# Patient Record
Sex: FEMALE | Race: WHITE | NOT HISPANIC OR LATINO | Employment: UNEMPLOYED | URBAN - METROPOLITAN AREA
[De-identification: names, ages, dates, MRNs, and addresses within clinical notes are randomized per-mention and may not be internally consistent; named-entity substitution may affect disease eponyms.]

---

## 2017-08-15 ENCOUNTER — HOSPITAL ENCOUNTER (EMERGENCY)
Facility: HOSPITAL | Age: 57
Discharge: HOME | End: 2017-08-15
Attending: FAMILY MEDICINE | Admitting: FAMILY MEDICINE
Payer: MEDICAID

## 2017-08-15 VITALS
RESPIRATION RATE: 16 BRPM | HEIGHT: 65 IN | WEIGHT: 210 LBS | HEART RATE: 85 BPM | BODY MASS INDEX: 34.99 KG/M2 | DIASTOLIC BLOOD PRESSURE: 97 MMHG | TEMPERATURE: 98 F | SYSTOLIC BLOOD PRESSURE: 173 MMHG

## 2017-08-15 DIAGNOSIS — M25.562 PAIN IN LEFT KNEE: ICD-10-CM

## 2017-08-15 DIAGNOSIS — M25.561 PAIN IN RIGHT KNEE: ICD-10-CM
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
## 2017-08-15 DIAGNOSIS — M54.2 PAIN, NECK: ICD-10-CM

## 2017-08-15 DIAGNOSIS — M54.9 PAIN IN BACK: ICD-10-CM

## 2017-08-15 LAB — POCT GLUCOSE: 99 MG/DL (ref 70–110)

## 2017-08-15 PROCEDURE — 82962 GLUCOSE BLOOD TEST: CPT

## 2017-08-15 PROCEDURE — 99284 EMERGENCY DEPT VISIT MOD MDM: CPT

## 2017-08-15 PROCEDURE — 25000003 PHARM REV CODE 250: Performed by: PHYSICIAN ASSISTANT

## 2017-08-15 PROCEDURE — 99284 EMERGENCY DEPT VISIT MOD MDM: CPT | Mod: ,,, | Performed by: PHYSICIAN ASSISTANT

## 2017-08-15 RX ORDER — ACETAMINOPHEN 325 MG/1
650 TABLET ORAL
Status: COMPLETED | OUTPATIENT
Start: 2017-08-15 | End: 2017-08-15

## 2017-08-15 RX ADMIN — ACETAMINOPHEN 650 MG: 325 TABLET ORAL at 03:08

## 2017-08-15 NOTE — DISCHARGE INSTRUCTIONS
Take Tylenol on a scheduled basis regularly for the next few days for pain relief.  Apply ice to the areas.  Elevate areas higher than the level of the heart to reduce swelling and pain.  Follow-up with primary care physician at [facility] if your symptoms do not improve or worsen.    No future appointments.    Our goal in the emergency department is to always give you outstanding care and exceptional service. You may receive a survey by mail or e-mail in the next week regarding your experience in our ED. We would greatly appreciate your completing and returning the survey. Your feedback provides us with a way to recognize our staff who give very good care and it helps us learn how to improve when your experience was below our aspiration of excellence.

## 2017-08-15 NOTE — ED PROVIDER NOTES
Encounter Date: 8/15/2017       History     Chief Complaint   Patient presents with    Back Pain     Patient reports bilateral knee, shoulder, neck, and back pain with a headache after tripping on a cord.     Patient is a 57-year-old female with past medical history of HTN, DM, COPD, BP 1 who presents the ED via EMS status post fall.  Patient states that she was at an eyeglass store when she turned and tripped over her sister's oxygen tank.  Patient states that she fell and landed on her upper extremities initially then her whole body.  She denies any head trauma or LOC.  Patient complains of generalized pain to her back, neck, and lower extremities.  She complains of 9/10 pain mainly to her knees.  She denies any fever, chills, lower extremity paresthesias, bowel/bladder incontinence, saddle anesthesias.  She has no other complaint at this time.          Review of patient's allergies indicates:   Allergen Reactions    Neomycin Shortness Of Breath     Past Medical History:   Diagnosis Date    Anemia     Anorexia     Anxiety     Bipolar 1 disorder     Bulimia     COPD (chronic obstructive pulmonary disease)     Diabetes mellitus     GERD (gastroesophageal reflux disease)     Gout     H/O bladder problems     Hypertension     Panic attacks      Past Surgical History:   Procedure Laterality Date    DILATION AND CURETTAGE OF UTERUS      fatty tumor      l wrist      NASAL ENDOSCOPY W/ DONIS SINUPLASTY  08/01/2017    TONSILLECTOMY      TUBAL LIGATION       Family History   Problem Relation Age of Onset    Diabetes Mother     Hypertension Mother     Diabetes Father     Hypertension Father      Social History   Substance Use Topics    Smoking status: Current Every Day Smoker     Packs/day: 0.50     Years: 30.00     Types: Cigarettes    Smokeless tobacco: Never Used    Alcohol use No     Review of Systems   Constitutional: Negative for chills and fever.   HENT: Negative for nosebleeds and sore  throat.    Eyes: Negative for visual disturbance.   Respiratory: Negative for shortness of breath.    Cardiovascular: Negative for chest pain.   Gastrointestinal: Negative for abdominal pain and nausea.   Endocrine: Negative for polyuria.   Genitourinary: Negative for dysuria.   Musculoskeletal: Positive for arthralgias. Negative for back pain and joint swelling.   Skin: Negative for rash.   Neurological: Negative for weakness and numbness.   Hematological: Does not bruise/bleed easily.       Physical Exam     Initial Vitals [08/15/17 1336]   BP Pulse Resp Temp SpO2   (!) 152/88 88 15 98 °F (36.7 °C) --      MAP       109.33         Physical Exam    Vitals reviewed.  Constitutional: She appears well-developed and well-nourished. She is not diaphoretic. No distress.   HENT:   Head: Normocephalic and atraumatic.   Nose: Nose normal.   Eyes: Conjunctivae and EOM are normal.   Neck: Normal range of motion.   Cardiovascular: Normal rate, regular rhythm and normal heart sounds. Exam reveals no friction rub.    No murmur heard.  Pulses:       Dorsalis pedis pulses are 2+ on the right side, and 2+ on the left side.   Pulmonary/Chest: Breath sounds normal. No respiratory distress. She has no wheezes. She has no rales.   Abdominal: Soft. Bowel sounds are normal. She exhibits no distension. There is no tenderness. There is no rebound.   Musculoskeletal: Normal range of motion.        Legs:  Neurological: She is alert and oriented to person, place, and time. She has normal strength. No sensory deficit.   Sensations intact.   Skin: Skin is warm and dry. No erythema. No pallor.   Psychiatric: She has a normal mood and affect. Her behavior is normal. Judgment and thought content normal.         ED Course   Procedures  Labs Reviewed   POCT GLUCOSE             Medical Decision Making:   History:   Old Medical Records: I decided to obtain old medical records.  Clinical Tests:   Radiological Study: Ordered and Reviewed    Imaging  Results          X-Ray Knee 3 View Right (Final result)  Result time 08/15/17 16:35:22    Final result by Quinton Frias MD (08/15/17 16:35:22)                 Impression:     See above      Electronically signed by: Quinton Frias MD  Date:     08/15/17  Time:    16:35              Narrative:    3 views    Healing or healed fracture of the proximal fibula identified.  DJD and joint space narrowing.  No acute fractures or dislocation.  No bone destruction identified                             X-Ray Tibia Fibula 2 View Left (Final result)  Result time 08/15/17 16:35:44    Final result by Quinton Frias MD (08/15/17 16:35:44)                 Impression:     See above      Electronically signed by: Quinton Frias MD  Date:     08/15/17  Time:    16:35              Narrative:    2 views    No fracture or dislocation.  No bone destruction identified                                 APC / Resident Notes:   DDX includes but is not limited to bony contusion, soft tissue contusion, fracture.  I have considered but do not suspect back fracture/dislocation, cauda equina syndrome, septic arthritis.   Will obtain x-rays, give ice and acetaminophen, and reassess.    Right knee x-ray with old healed fracture of the proximal fibula.  I did not appreciate any bony tenderness in this location.  Otherwise, no acute fracture, dislocation, or bony disruption seen.  Left tibia fibula x-ray with no fracture, dislocation, or bony distraction.    She was updated with results. I advise her to continue acetaminophen for pain relief. Last Cr 1.15; will avoid NSAIDs. Continue to apply ice.  Elevate legs high than the level of your heart.  Rest.  Follow-up with PCP if symptoms continue or worsen.  She voices understanding.  Patient is comfortable with plan and stable for discharge.  I reviewed patient's chart and discussed this case with my supervising M.D.                  ED Course     Clinical Impression:   Diagnoses of Fall and Fall were pertinent  to this visit.    Disposition:   Disposition: Discharged  Condition: Stable                        Yola Polanco PA-C  08/15/17 [id number]

## 2017-08-15 NOTE — ED TRIAGE NOTES
Patient tripped over oxygen tank , fell down and hit her whole body on the floor.  + pain on her knees, ankles, shoulder, headache. +dizziness and nausea. Denies vomiting.

## 2017-08-15 NOTE — ED NOTES
LOC: The patient is awake, alert, aware of environment with an appropriate affect. Oriented x4, speaking appropriately  APPEARANCE: Pt resting comfortably, in no acute distress, pt is clean and well groomed, clothing properly fastened  SKIN:The skin is warm and dry, color consistent with ethnicity, patient has normal skin turgor and moist mucus membranes  RESPIRATORY:Airway is open and patent, respirations are spontaneous, patient has a normal effort and rate, no accessory muscle use noted.  CARDIAC: Normal rate and rhythm, no peripheral edema noted, capillary refill < 3 seconds  NEUROLOGIC: PERRLA, facial expression is symmetrical, patient moving all extremities spontaneously, normal sensation in all extremities when touched with a finger.  Follows all commands appropriately  MUSCULOSKELETAL: Patient moving all extremities spontaneously. Pain on her knees, ankles, shoulder secondary to fall